# Patient Record
Sex: FEMALE | HISPANIC OR LATINO | Employment: FULL TIME | URBAN - METROPOLITAN AREA
[De-identification: names, ages, dates, MRNs, and addresses within clinical notes are randomized per-mention and may not be internally consistent; named-entity substitution may affect disease eponyms.]

---
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
## 2024-01-01 PROCEDURE — 0503F POSTPARTUM CARE VISIT: CPT | Performed by: OBSTETRICS & GYNECOLOGY

## 2024-01-01 PROCEDURE — 25010000002 ONDANSETRON PER 1 MG: Performed by: OBSTETRICS & GYNECOLOGY

## 2024-01-01 RX ORDER — IBUPROFEN 600 MG/1
600 TABLET ORAL EVERY 6 HOURS SCHEDULED
Status: DISCONTINUED | OUTPATIENT
Start: 2024-01-01 | End: 2024-01-01

## 2024-01-01 RX ORDER — OXYTOCIN/0.9 % SODIUM CHLORIDE 30/500 ML
125 PLASTIC BAG, INJECTION (ML) INTRAVENOUS CONTINUOUS PRN
Status: DISCONTINUED | OUTPATIENT
Start: 2024-01-01 | End: 2024-01-02 | Stop reason: HOSPADM

## 2024-01-01 RX ORDER — IBUPROFEN 600 MG/1
600 TABLET ORAL EVERY 6 HOURS SCHEDULED
Status: DISCONTINUED | OUTPATIENT
Start: 2024-01-01 | End: 2024-01-02 | Stop reason: HOSPADM

## 2024-01-01 RX ORDER — POLYETHYLENE GLYCOL 3350 17 G/17G
17 POWDER, FOR SOLUTION ORAL DAILY PRN
Status: DISCONTINUED | OUTPATIENT
Start: 2024-01-01 | End: 2024-01-02 | Stop reason: HOSPADM

## 2024-01-01 RX ORDER — TRAMADOL HYDROCHLORIDE 50 MG/1
50 TABLET ORAL EVERY 6 HOURS PRN
Status: DISCONTINUED | OUTPATIENT
Start: 2024-01-01 | End: 2024-01-02 | Stop reason: HOSPADM

## 2024-01-01 RX ORDER — CALCIUM CARBONATE 500 MG/1
2 TABLET, CHEWABLE ORAL 3 TIMES DAILY PRN
Status: DISCONTINUED | OUTPATIENT
Start: 2024-01-01 | End: 2024-01-02 | Stop reason: HOSPADM

## 2024-01-01 RX ORDER — ONDANSETRON 4 MG/1
4 TABLET, FILM COATED ORAL EVERY 8 HOURS PRN
Status: DISCONTINUED | OUTPATIENT
Start: 2024-01-01 | End: 2024-01-02 | Stop reason: HOSPADM

## 2024-01-01 RX ORDER — PROMETHAZINE HYDROCHLORIDE 12.5 MG/1
12.5 TABLET ORAL EVERY 4 HOURS PRN
Status: DISCONTINUED | OUTPATIENT
Start: 2024-01-01 | End: 2024-01-02 | Stop reason: HOSPADM

## 2024-01-01 RX ORDER — ACETAMINOPHEN 325 MG/1
650 TABLET ORAL EVERY 6 HOURS
Status: DISCONTINUED | OUTPATIENT
Start: 2024-01-01 | End: 2024-01-02 | Stop reason: HOSPADM

## 2024-01-01 RX ORDER — PRENATAL VIT/IRON FUM/FOLIC AC 27MG-0.8MG
1 TABLET ORAL DAILY
Status: DISCONTINUED | OUTPATIENT
Start: 2024-01-01 | End: 2024-01-02 | Stop reason: HOSPADM

## 2024-01-01 RX ORDER — DOCUSATE SODIUM 100 MG/1
100 CAPSULE, LIQUID FILLED ORAL 2 TIMES DAILY PRN
Qty: 60 CAPSULE | Refills: 1 | Status: SHIPPED | OUTPATIENT
Start: 2024-01-01

## 2024-01-01 RX ORDER — ACETAMINOPHEN 325 MG/1
650 TABLET ORAL EVERY 6 HOURS PRN
Qty: 30 TABLET | Refills: 1 | Status: SHIPPED | OUTPATIENT
Start: 2024-01-01

## 2024-01-01 RX ORDER — SODIUM CHLORIDE 0.9 % (FLUSH) 0.9 %
1-10 SYRINGE (ML) INJECTION AS NEEDED
Status: DISCONTINUED | OUTPATIENT
Start: 2024-01-01 | End: 2024-01-02 | Stop reason: HOSPADM

## 2024-01-01 RX ORDER — DOCUSATE SODIUM 100 MG/1
100 CAPSULE, LIQUID FILLED ORAL DAILY
Status: DISCONTINUED | OUTPATIENT
Start: 2024-01-01 | End: 2024-01-02 | Stop reason: HOSPADM

## 2024-01-01 RX ORDER — IBUPROFEN 600 MG/1
600 TABLET ORAL EVERY 6 HOURS PRN
Qty: 30 TABLET | Refills: 1 | Status: SHIPPED | OUTPATIENT
Start: 2024-01-01

## 2024-01-01 RX ADMIN — IBUPROFEN 600 MG: 600 TABLET, FILM COATED ORAL at 01:46

## 2024-01-01 RX ADMIN — ACETAMINOPHEN 650 MG: 325 TABLET ORAL at 04:57

## 2024-01-01 RX ADMIN — ACETAMINOPHEN 650 MG: 325 TABLET ORAL at 21:08

## 2024-01-01 RX ADMIN — Medication: at 01:46

## 2024-01-01 RX ADMIN — WITCH HAZEL: 500 SOLUTION RECTAL; TOPICAL at 01:46

## 2024-01-01 RX ADMIN — ACETAMINOPHEN 650 MG: 325 TABLET ORAL at 15:27

## 2024-01-01 RX ADMIN — DOCUSATE SODIUM 100 MG: 100 CAPSULE, LIQUID FILLED ORAL at 08:25

## 2024-01-01 RX ADMIN — IBUPROFEN 600 MG: 600 TABLET, FILM COATED ORAL at 17:27

## 2024-01-01 RX ADMIN — IBUPROFEN 600 MG: 600 TABLET, FILM COATED ORAL at 08:25

## 2024-01-01 RX ADMIN — ONDANSETRON 4 MG: 2 INJECTION INTRAMUSCULAR; INTRAVENOUS at 02:25

## 2024-01-01 RX ADMIN — VITAMIN A, VITAMIN C, VITAMIN D, VITAMIN E, THIAMINE, RIBOFLAVIN, NIACIN, VITAMIN B6, FOLIC ACID, VITAMIN B12, CALCIUM, IRON, ZINC, COPPER 1 TABLET: 4000; 120; 400; 22; 1.84; 3; 20; 10; 1; 12; 200; 27; 25; 2 TABLET ORAL at 08:25

## 2024-01-01 RX ADMIN — IBUPROFEN 600 MG: 600 TABLET, FILM COATED ORAL at 13:03

## 2024-01-01 RX ADMIN — ACETAMINOPHEN 650 MG: 325 TABLET ORAL at 11:49

## 2024-01-01 NOTE — DISCHARGE SUMMARY
OB Discharge Summary        Admit Date:  2023  Date of Delivery: 2023   Discharge Date: 24      Reason for Admission/Chief Complaint: Contractions    Final Diagnosis:  38+0, active labor  SROM    To do at FU: Routine postpartum    Antepartum:  Prenatal care is complicated by:  See above Final Diagnosis for list    Intrapartum/Delivery:  OB Surgeon:  Dr. Tammy Delcid DO  Anesthesia: Epidural, Local for repair of episiotomy  Delivery Type:   Perineum : Right mediolateral episiotomy  Feeding method: Breast    Infant: male  infant; Grey    Weight: 3745 g (8 lb 4.1 oz)      APGARS: 8  @ 1 minute / 9  @ 5 minutes    Hospital Course/Significant Findings:  Patient arrived with regular contractions 1 cm dilated, SROM in triage, progressed spontaneously to 3-4 cm dilated.  Pitocin augmentation max to 2 milliunits.  Progressed on the labor curve.  Pushed for approximately 1-1/2 hours.  Uncomplicated  and PP.  On the day of discharge POSTPARTUM pt tolerating a regular diet, ambulating, pain well controlled, urinating spontaneously and lochia appropriate.   Vital signs were stable and afebrile.  Exam was within normal limits.  Fundus was below umbilicus and nontender.  Meeting discharge criteria and desired discharge home.  Postpartum instructions and FU reviewed and questions answered.    Results from last 7 days   Lab Units 23  1129   WBC 10*3/mm3 9.87   HEMOGLOBIN g/dL 11.4*   HEMATOCRIT % 33.8*   PLATELETS 10*3/mm3 185       Discharge:         Discharge Medications        New Medications        Instructions Start Date   acetaminophen 325 MG tablet  Commonly known as: Tylenol   650 mg, Oral, Every 6 Hours PRN      docusate sodium 100 MG capsule  Commonly known as: Colace   100 mg, Oral, 2 Times Daily PRN      ibuprofen 600 MG tablet  Commonly known as: ADVIL,MOTRIN   600 mg, Oral, Every 6 Hours PRN             Continue These Medications        Instructions Start Date   calcium carbonate 500  MG chewable tablet  Commonly known as: TUMS   1 tablet, Oral, Daily      Prenatal 27-1 27-1 MG tablet tablet   1 tablet, Oral, Daily                 Disposition: Home  Diet: Regular    Activity: Pelvic rest 6 weeks    Condition at discharge: Good    Follow up with: Dr. Tammy Delcid DO or provider of her choice    Follow up in: 5 weeks      Complications: None      Signature: guzman valiente MD      [facility] LABOR AND DELIVERY  [address] [place] [address]  [facility] [address]  Dept: [phone]  Loc: [phone]

## 2024-01-01 NOTE — LACTATION NOTE
LC in to assist with this feeding. LC noted that patient is using a nipple shield. Baby is sluggish this feeding and has poor tongue extension and is irritable. LC then hand expressed to spoon and spoonfed at this feeding.

## 2024-01-01 NOTE — PAYOR COMM NOTE
"Dana Kelly (18 y.o. Female)       Date of Birth   2005    Social Security Number       Address   [address] [place] [address]    Home Phone       MRN   [id number]       Faith   None    Marital Status   Single                            Admission Date   12/31/23    Admission Type   Elective    Admitting Provider   Renaldo Cuevas MD    Attending Provider   Tammy Delcid DO    Department, Room/Bed   [facility]GUETA MOTHER BABY, M434/1       Discharge Date       Discharge Disposition       Discharge Destination                                 Attending Provider: Tammy Delcid DO    Allergies: No Known Allergies    Isolation: None   Infection: None   Code Status: CPR    Ht: 162.6 cm (64\")   Wt: 92.5 kg (204 lb)    Admission Cmt: None   Principal Problem: Normal labor [O80,Z37.9]                   Active Insurance as of 12/31/2023       Primary Coverage       Payor Plan Insurance Group Employer/Plan Group    [facility]       Payor Plan Address Payor Plan Phone Number Payor Plan Fax Number Effective Dates    [address] [phone]  10/30/2019 - None Entered    [facility] [id number]         Subscriber Name Subscriber Birth Date Member ID       JACK KELLY 8/8/1978 [id number]               Secondary Coverage       Payor Plan Insurance Group Employer/Plan Group    [place] [id number]       Payor Plan Address Payor Plan Phone Number Payor Plan Fax Number Effective Dates    [employer] [address] [address] [phone]  5/1/2023 - None Entered    [facility] [address]         Subscriber Name Subscriber Birth Date Member ID       DANA KELLY 2005 [id number]                     Emergency Contacts        (Rel.) Home Phone Work Phone Mobile Phone    JACK KELLY (Mother) [phone] -- [phone]    Nitin Kelly (Father) [phone]          MRN: [id number]  CSN: " [id number]    [facility] rTessa ,DOC   [phone]  F [phone]          Tammy Delcid DO   Physician  OB/GYN     Progress Notes     Signed     Date of Service: 12/31/23 1919  Creation Time: 12/31/23 1919     Signed         OB Intrapartum Note     Subjective: Pain NOT controlled     Objective:  Baseline:         Normal 110-160 bpm  Variability:       Moderate/Normal (amplitude 6-25 bpm)  Accelerations: Present (32 weeks+) 15 x 15 bpm  Decelerations: None  Contractions:   Regular, q2min, Pitocin at 2milliunits/min     Cervical exam:                          Dilation: AL                          Effacement: 100%                          Station: +1     Impression:    Category I  Reassuring fetus, Adequate progress, Pain not controlled     Plan:   Continue pitocin  Continue to monitor  Active labor positioning  Pelvis feels clinically adequate  Bolus epidural  Reviewed course/progress/plan with pt.  All questions answered to pts satisfaction.  Pt desires to proceed as outlined.  Specific labor and delivery desires: : delayed bathing and cord clamping           Electronically signed by Tammy Delcid DO, 12/31/23, 7:19 PM EST.                  DEL VAG 12/31 MALE-8.4.1# APGARS 8/9 WELL BABY

## 2024-01-01 NOTE — ANESTHESIA POSTPROCEDURE EVALUATION
Patient: Dana Kelly    Procedure Summary       Date: 12/31/23 Room / Location:     Anesthesia Start: 1150 Anesthesia Stop: 2240    Procedure: LABOR ANALGESIA Diagnosis:     Scheduled Providers:  Provider: Keila Sanz CRNA    Anesthesia Type: epidural ASA Status: 2            Anesthesia Type: epidural    Vitals  Vitals Value Taken Time   /64 01/01/24 0457   Temp 37.1 °C (98.8 °F) 01/01/24 0457   Pulse 82 01/01/24 0457   Resp 18 01/01/24 0457   SpO2 100 % 01/01/24 0149   Vitals shown include unfiled device data.        Post Anesthesia Care and Evaluation    Patient location during evaluation: bedside  Patient participation: complete - patient participated  Level of consciousness: awake  Pain score: 2  Pain management: adequate    Airway patency: patent  Anesthetic complications: No anesthetic complications  PONV Status: controlled  Cardiovascular status: acceptable and stable  Respiratory status: acceptable  Hydration status: acceptable  Post Neuraxial Block status: Motor and sensory function returned to baseline and No signs or symptoms of PDPH

## 2024-01-01 NOTE — PROGRESS NOTES
PostPartum/PostOp PROGRESS NOTE        Subjective:  Patient has no complaints  Pain controlled  Ambulating  Urinating spontaneously  Lochia decreasing, no bleeding concerns    Objective:     Temp:  [97.8 °F (36.6 °C)-98.9 °F (37.2 °C)] 98.8 °F (37.1 °C)  Heart Rate:  [] 82  Resp:  [16-20] 18  BP: ()/(42-97) 121/64  General- NAD, alert and oriented, appropriate  Psych- Normal mood, good memory  Cardiovascular/Respiratory- Not examined  Abdomen- Fundus firm, non tender and Fundus at U-1  Extremties/DTRs- No edema, bilaterally equal, no signs of DVT    Results from last 7 days   Lab Units 12/31/23  1129   WBC 10*3/mm3 9.87   HEMOGLOBIN g/dL 11.4*   HEMATOCRIT % 33.8*   PLATELETS 10*3/mm3 185       Assessment:    Post-partum/postop Day:  1  The patient is currently breastfeeding.    Doing well    Plan:   Routine postpartum/postop care  Remove IV  Shower  Sitz baths  Breast feeding support  DC meds reviewed  Follow up scheduled  OB Hospitalist will see pt tomorrow and probable DC tomorrow              Electronically signed by Tammy Delcid DO, 01/01/24, 7:59 AM EST.

## 2024-01-01 NOTE — DISCHARGE INSTRUCTIONS
DR. HILTON'S POSTPARTUM DISCHARGE PRECAUTIONS and Answers to FAQs     NO SEX for SIX weeks.     NO TUB BATH or POOL for TWO week(s), shower only.  Sitz baths are fine.     STITCHES (if present):  wash them daily in the shower with soap and water (any type of soap is fine, it does not need to be antibacterial soap).  It is ok to gently put your finger in and around the vaginal area.  Look at your stitches (the ones on the outside) when you get home.  You will then know what is normal and can have a point of reference to compare it to if you start to have concerns.  REDNESS, PUS, increase in PAIN, FEVER or CHILLS are all reasons to be seen our office immediately.  Go to the ER, if it is after hours or a weekend.       VAGINAL BLEEDING:  may continue on and off over the next several weeks after delivery and may increase slightly once you go home.  You should not be bleeding more than 1 large pad soaked every hour or two.  Clots (even the size of a lemon or larger) may be normal as long as the bleeding is not heavy and the clots do not continue.       FEVER or CHILLS or NOT FEELING WELL: call our office.  If the office is closed, you need to be seen in acute care or ER.       CHEST PAIN or SHORTNESS OF BREATH/AIR: you need to GO TO THE NEAREST ER or CALL 911.      SWELLING: can increase over the next 7-10 days and then should slowly improve.  Your legs/ankles should be fairly similar in size.  A red, painful, hot, swollen leg (usually just one side) can be a sign of a blood clot and should be evaluated immediately.  Call our office.  If it is after hours or a weekend, you must be seen IMMEDIATELY IN THE ER.      ELEVATED BLOOD PRESSURE:  you need to contact us if you are having  persistent elevated BP systolic (top number) more than 155 or diastolic (bottom number) more than 95, or a headache (not relieved with rest, hydration or over the counter pain reliever), an increase in your swelling (usually hands and face),  changes in your vision (typically flashing white or black spots) or severe persistent pain in the location of the upper right side of your belly (under your right breast).  Call our office or go to ER if after hours or a weekend.     LACTATION QUESTIONS or CONCERNS?  Call [facility] Lactation Support [phone].     WORK and SCHOOL TIME OFF: depends on your specific delivery type, surrounding circumstances, and your work insurance/school rules.  If you have questions, please call Ita Gonzales at [phone] (ext. [phone]).  Or email Ita at [email or web address].  They will assist in required paperwork for you and/or family members.      Any further QUESTIONS or CONCERNS, please call [facility] at [phone].

## 2024-01-01 NOTE — PROGRESS NOTES
OB Intrapartum Note    Subjective: Pain NOT controlled    Objective:  Baseline: Normal 110-160 bpm  Variability:   Moderate/Normal (amplitude 6-25 bpm)  Accelerations: Present (32 weeks+) 15 x 15 bpm  Decelerations: None  Contractions:  Regular, q2min, Pitocin at 2milliunits/min    Cervical exam:    Dilation: AL    Effacement: 100%    Station: +1    Impression:    Category I  Reassuring fetus, Adequate progress, Pain not controlled    Plan:   Continue pitocin  Continue to monitor  Active labor positioning  Pelvis feels clinically adequate  Bolus epidural  Reviewed course/progress/plan with pt.  All questions answered to pts satisfaction.  Pt desires to proceed as outlined.  Specific labor and delivery desires: : delayed bathing and cord clamping        Electronically signed by Tammy Delcid DO, 12/31/23, 7:19 PM EST.

## 2024-01-01 NOTE — L&D DELIVERY NOTE
VAGINAL DELIVERY NOTE          Date: 12/31/2023   Time:  10:40 PM   GA: 38w0d    Infant: male  infant   3745 g (8 lb 4.1 oz)     APGARS: 8  @ 1 minute / 9  @ 5 minutes    Delivery: The patient progressed to complete, complete and pushed for proximately 90 minutes to deliver a viable infant in cephalic presentation weighing the above weight and above Apgars.  At the +4 station patient was consented for episiotomy.  Lidocaine was used to infiltrate the perineum.  After episiotomy, delivery with the next push.  There was no nuchal cord.   The mouth and nares were bulb suctioned on the perineum.  The left anterior and right posterior shoulders were easily delivered without traction.  The remainder of the body delivered.  The infant was vigorous.  Delayed cord clamping for 60 seconds.  The cord was then clamped and cut.  Infant was placed on the maternal chest for recovery.  The placenta delivered intact with the assistance of fundal massage and maternal pushing efforts.      On full evaluation of the perineum, vagina, cervix and rectum a repair was placed.  Right mediolateral episiotomy without an extension, 2-0 Vicryl.  It was repaired in the usual fashion in two layers and stitches to support the perineum.  Excellent approximation and hemostasis was assured.  External anal sphincter was intact.  200mcg of Cytotec was given orally and 200mcg given SL to assist with uterine tone.    Counts were correct.      Estimated Blood Loss: 400 mls    Complications: None        Electronically signed by Tammy Delcid DO, 12/31/23, 11:17 PM EST.       [facility] LABOR AND DELIVERY  [address] [place] [address] [place] [address]  Dept: [phone]  Loc: [phone]

## 2024-01-01 NOTE — PROGRESS NOTES
Called by RN at 17:15 stating patients has hard time using right leg and unable to bare weight.   I came to evaluate pt D/B 4/4 and equal in lower extremity.  Equal sensation bilaterally. Patient states that her right groin is very sore and difficult to bend her knee.  Asked patient why she didn't tell me or OBGYN this am she didn't have an answer other than it was early.  Going to order SCD on patient while not mobile and will reevaluate in the am.  Also will have the nurse to call the OBGYN on for today

## 2024-01-02 ENCOUNTER — TELEPHONE (OUTPATIENT)
Dept: OBSTETRICS AND GYNECOLOGY | Facility: CLINIC | Age: 19
End: 2024-01-02

## 2024-01-02 VITALS
RESPIRATION RATE: 18 BRPM | HEIGHT: 64 IN | SYSTOLIC BLOOD PRESSURE: 115 MMHG | DIASTOLIC BLOOD PRESSURE: 57 MMHG | WEIGHT: 204 LBS | OXYGEN SATURATION: 99 % | TEMPERATURE: 97.7 F | BODY MASS INDEX: 34.83 KG/M2 | HEART RATE: 66 BPM

## 2024-01-02 PROCEDURE — 97161 PT EVAL LOW COMPLEX 20 MIN: CPT

## 2024-01-02 RX ADMIN — DOCUSATE SODIUM 100 MG: 100 CAPSULE, LIQUID FILLED ORAL at 09:27

## 2024-01-02 RX ADMIN — VITAMIN A, VITAMIN C, VITAMIN D, VITAMIN E, THIAMINE, RIBOFLAVIN, NIACIN, VITAMIN B6, FOLIC ACID, VITAMIN B12, CALCIUM, IRON, ZINC, COPPER 1 TABLET: 4000; 120; 400; 22; 1.84; 3; 20; 10; 1; 12; 200; 27; 25; 2 TABLET ORAL at 09:27

## 2024-01-02 RX ADMIN — ACETAMINOPHEN 650 MG: 325 TABLET ORAL at 09:27

## 2024-01-02 RX ADMIN — WITCH HAZEL: 500 SOLUTION RECTAL; TOPICAL at 03:18

## 2024-01-02 RX ADMIN — ACETAMINOPHEN 650 MG: 325 TABLET ORAL at 03:18

## 2024-01-02 RX ADMIN — IBUPROFEN 600 MG: 600 TABLET, FILM COATED ORAL at 00:22

## 2024-01-02 RX ADMIN — IBUPROFEN 600 MG: 600 TABLET, FILM COATED ORAL at 12:23

## 2024-01-02 RX ADMIN — IBUPROFEN 600 MG: 600 TABLET, FILM COATED ORAL at 05:48

## 2024-01-02 NOTE — PLAN OF CARE
Problem: Adult Inpatient Plan of Care  Goal: Plan of Care Review  Outcome: Met  Flowsheets (Taken 1/2/2024 1407)  Progress: improving  Plan of Care Reviewed With: patient  Goal: Patient-Specific Goal (Individualized)  Outcome: Met  Goal: Absence of Hospital-Acquired Illness or Injury  Outcome: Met  Intervention: Identify and Manage Fall Risk  Recent Flowsheet Documentation  Taken 1/2/2024 0927 by Dakota Trevino RN  Safety Promotion/Fall Prevention: safety round/check completed  Taken 1/2/2024 0800 by Dakota Trevino RN  Safety Promotion/Fall Prevention:   safety round/check completed   toileting scheduled  Taken 1/2/2024 0718 by Dakota Trevino RN  Safety Promotion/Fall Prevention: safety round/check completed  Intervention: Prevent and Manage VTE (Venous Thromboembolism) Risk  Recent Flowsheet Documentation  Taken 1/2/2024 0927 by Dakota Trevino RN  Activity Management:   ambulated in room   ambulated to bathroom  VTE Prevention/Management:   bilateral   sequential compression devices on  Range of Motion:   active ROM (range of motion) encouraged   ROM (range of motion) performed  Goal: Optimal Comfort and Wellbeing  Outcome: Met  Intervention: Monitor Pain and Promote Comfort  Recent Flowsheet Documentation  Taken 1/2/2024 0927 by Dakota Trevino RN  Pain Management Interventions: see MAR  Intervention: Provide Person-Centered Care  Recent Flowsheet Documentation  Taken 1/2/2024 0927 by Dakota Trevino RN  Trust Relationship/Rapport:   care explained   choices provided   questions answered   questions encouraged  Goal: Readiness for Transition of Care  Outcome: Met  Intervention: Mutually Develop Transition Plan  Recent Flowsheet Documentation  Taken 1/2/2024 1401 by Dakota Trevino RN  Equipment Needed After Discharge: none  Anticipated Changes Related to Illness: none  Transportation Concerns: none  Concerns to be Addressed: no discharge needs identified  Readmission Within the Last 30 Days: no  previous admission in last 30 days     Problem: Skin Injury Risk Increased  Goal: Skin Health and Integrity  Outcome: Met     Problem: Breastfeeding  Goal: Effective Breastfeeding  Outcome: Met  Intervention: Support Exclusive Breastfeeding Success  Recent Flowsheet Documentation  Taken 1/2/2024 0927 by Dakota Trevino RN  Supportive Measures: active listening utilized  Intervention: Promote Effective Breastfeeding  Recent Flowsheet Documentation  Taken 1/2/2024 0927 by Dakota Trevino RN  Parent/Child Attachment Promotion:   caring behavior modeled   positive reinforcement provided     Problem: Adjustment to Role Transition (Postpartum Vaginal Delivery)  Goal: Successful Maternal Role Transition  Outcome: Met  Intervention: Support Maternal Role Transition  Recent Flowsheet Documentation  Taken 1/2/2024 0927 by Dakota Trevino RN  Supportive Measures: active listening utilized  Parent/Child Attachment Promotion:   caring behavior modeled   positive reinforcement provided     Problem: Bleeding (Postpartum Vaginal Delivery)  Goal: Hemostasis  Outcome: Met     Problem: Infection (Postpartum Vaginal Delivery)  Goal: Absence of Infection Signs/Symptoms  Outcome: Met  Intervention: Prevent or Manage Infection  Recent Flowsheet Documentation  Taken 1/2/2024 0927 by Dakota Trevino RN  Perineal Care: perineal hygiene encouraged     Problem: Pain (Postpartum Vaginal Delivery)  Goal: Acceptable Pain Control  Outcome: Met  Intervention: Prevent or Manage Pain  Recent Flowsheet Documentation  Taken 1/2/2024 0927 by Dakota Trevino RN  Pain Management Interventions: see MAR     Problem: Urinary Retention (Postpartum Vaginal Delivery)  Goal: Effective Urinary Elimination  Outcome: Met  Intervention: Promote Effective Urinary Elimination  Recent Flowsheet Documentation  Taken 1/2/2024 0927 by Dakota Trevino RN  Urinary Elimination Promotion: frequent voiding encouraged   Goal Outcome Evaluation:  Plan of Care Reviewed  With: patient        Progress: improving

## 2024-01-02 NOTE — TELEPHONE ENCOUNTER
The [facility] received a fax that requires your attention. The document has been indexed to the patient’s chart for your review.      Reason for sending: FAX FOR TASNEEM HILTON DO    Documents Description: [employer] AUTH FOR DELIVERY AUTH # [id number] FROM 12/31/23 WITH TASNEEM HILTON DO.     Name of Sender: [employer]    Date Indexed: 1/1/24

## 2024-01-02 NOTE — LACTATION NOTE
Lactation nurse in to assist with feeding and go over discharge instructions. Infant had recently been circumcised and was irritable for this feeding. Infant placed in skin to skin and latched using nipple shield. Infant breast fed for about 5 minutes on the right breast using a nipple shield, but was very sluggish. Latch was attempted on left breast, but unsuccessful. Bottle of DEBM was given. Patient is planning on discharge today. LC discussed normal infant output patterns to expect and unlimited time/access to the breast but if infant is not waking by 3 hours to wake and feed using measures shown in the hospital. LC discussed checking to make sure new medications are safe to breastfeed. LC discussed alcohol use and cigarette/second hand smoke around baby and breastfeeding and discussed the impact of street drugs on infants and breastfeeding. LC used the page in the breastfeeding guide to discuss harmful effects of these. Breastfeeding/Lactation expectations and anticipatory guidance discussed for the next two weeks . LC discussed nipple care, plugged ducts, engorgement, and breast infection. LC encouraged mom to see pediatrician two days from discharge for follow up. Patient has a breastpump for home use and LC discussed good pumping guidelines and normal expectations with pumping and storage and preparation of ebm for feedings. LC discussed breastfeeding/lactation resources including the local breastfeeding support group after discharge and when to call the doctor. Patient showed good understanding.

## 2024-01-02 NOTE — THERAPY EVALUATION
Acute Care - Physical Therapy Initial Evaluation   Tressa     Patient Name: Dana Kelly  : 2005  MRN: [id number]  Today's Date: 2024      Visit Dx:     ICD-10-CM ICD-9-CM   1. Difficulty walking  R26.2 719.7     Patient Active Problem List   Diagnosis    Depressive disorder    Supervision of normal first pregnancy, antepartum    Full-term premature rupture of membranes     (normal spontaneous vaginal delivery)    Normal labor     Past Medical History:   Diagnosis Date    Allergic rhinitis     Anxiety     Asthma     uses inhalers    Depression     Exercise-induced asthma     PVC (premature ventricular contraction)     Holter Monitor - 10/2/2023   Maximum heart rate was 149.  Minimum heart rate of 54.  Average heart of 89.  There are occasional PVCs, couplets.  There was one short run of ventricular tachycardia which was nonsustained.  Echo EF 56-60%, wnl     Past Surgical History:   Procedure Laterality Date    CHOLECYSTECTOMY N/A 2022    Procedure: CHOLECYSTECTOMY LAPAROSCOPIC WITH INTRAOPERATIVE CHOLANGIOGRAM;  Surgeon: Ji Rosario MD;  Location: [facility];  Service: General;  Laterality: N/A;    TYMPANOSTOMY TUBE PLACEMENT       PT Assessment (last 12 hours)       PT Evaluation and Treatment       Row Name 24 1407          Physical Therapy Time and Intention    Subjective Information no complaints  -DP     Document Type evaluation  -DP     Mode of Treatment individual therapy;physical therapy  -DP     Patient Effort good  -DP       Row Name 24 1404          General Information    Patient Profile Reviewed yes  -DP     Patient Observations alert;cooperative;agree to therapy  -DP     General Observations of Patient Patient delivered a baby on 2023.  She has PT consult for complaint of right hip pain.  -DP     Prior Level of Function independent:;gait;transfer;bed mobility;ADL's  -DP     Equipment Currently Used at Home none  -DP     Existing  "Precautions/Restrictions no known precautions/restrictions  -DP     Barriers to Rehab none identified  -DP       Row Name 01/02/24 1407          Living Environment    Current Living Arrangements home  -DP     Home Accessibility stairs to enter home  -DP     People in Home significant other  -DP       Row Name 01/02/24 1407          Cognition    Orientation Status (Cognition) oriented x 3  -DP       Row Name 01/02/24 1407          Range of Motion Comprehensive    General Range of Motion bilateral upper extremity ROM WNL  Therapist assessed right hip flexion extension, external rotation and internal rotation range of motion.  Patient complained of no pain with range of motion or has any limitations.  -DP       Row Name 01/02/24 1407          Strength (Manual Muscle Testing)    Strength (Manual Muscle Testing) bilateral lower extremities  Patient has normal strength in bilateral lower extremity except right hip was assessed as 4+/5.  Patient did not complain of pain during strength testing  -DP       Row Name 01/02/24 1407          Bed Mobility    Bed Mobility supine-sit-supine  -DP     Supine-Sit-Supine Burton (Bed Mobility) independent  -DP       Row Name 01/02/24 1407          Transfers    Transfers sit-stand transfer  -DP       Row Name 01/02/24 1407          Sit-Stand Transfer    Sit-Stand Burton (Transfers) independent  -DP       Row Name 01/02/24 1407          Gait/Stairs (Locomotion)    Gait/Stairs Locomotion gait/ambulation independence  -DP     Burton Level (Gait) modified independence  -DP     Comment, (Gait/Stairs) Patient is able to ambulate ad maria ines. in her room, but complains of right hip \"grinding pain\"during ambulation  -DP       Row Name 01/02/24 1407          Balance    Balance Assessment standing dynamic balance  -DP     Dynamic Standing Balance supervision  -DP       Row Name 01/02/24 1407          Plan of Care Review    Outcome Evaluation Patient has a PT consult due to complaint " of right hip pain.  Patient was not noted to have any limitations with right hip range of motion or complaint of pain with range of motion.  Patient very minimal strength limitation with right hip but no complaint of pain during assessment.  Therapist educated patient on a home exercise program to include stretching exercises for bilateral lower extremities to perform before getting out of bed in the morning and if the pain does not get better then to follow-up with her primary care physician.  -DP       Row Name 01/02/24 1400          Therapy Assessment/Plan (PT)    Criteria for Skilled Interventions Met (PT) no problems identified which require skilled intervention  -DP     Therapy Frequency (PT) evaluation only  -DP       Row Name 01/02/24 1409          PT Evaluation Complexity    History, PT Evaluation Complexity no personal factors and/or comorbidities  -DP     Examination of Body Systems (PT Eval Complexity) total of 4 or more elements  -DP     Clinical Presentation (PT Evaluation Complexity) stable  -DP     Clinical Decision Making (PT Evaluation Complexity) low complexity  -DP     Overall Complexity (PT Evaluation Complexity) low complexity  -DP       Row Name 01/02/24 140          Physical Therapy Goals    Problem Specific Goal Selection (PT) problem specific goal 1, PT  -DP       Row Name 01/02/24 1401          Problem Specific Goal 1 (PT)    Problem Specific Goal 1 (PT) Complete PT evaluation.  -DP     Time Frame (Problem Specific Goal 1, PT) 1 day  -DP     Progress/Outcome (Problem Specific Goal 1, PT) goal met  -DP               User Key  (r) = Recorded By, (t) = Taken By, (c) = Cosigned By      Initials Name Provider Type    Tyson Corral, PT Physical Therapist                      PT Recommendation and Plan  Anticipated Discharge Disposition (PT): home  Therapy Frequency (PT): evaluation only  Outcome Evaluation: Patient has a PT consult due to complaint of right hip pain.  Patient was not noted  to have any limitations with right hip range of motion or complaint of pain with range of motion.  Patient very minimal strength limitation with right hip but no complaint of pain during assessment.  Therapist educated patient on a home exercise program to include stretching exercises for bilateral lower extremities to perform before getting out of bed in the morning and if the pain does not get better then to follow-up with her primary care physician.   Outcome Measures       Row Name 01/02/24 1400             How much help from another person do you currently need...    Turning from your back to your side while in flat bed without using bedrails? 4  -DP      Moving from lying on back to sitting on the side of a flat bed without bedrails? 4  -DP      Moving to and from a bed to a chair (including a wheelchair)? 4  -DP      Standing up from a chair using your arms (e.g., wheelchair, bedside chair)? 4  -DP      Climbing 3-5 steps with a railing? 4  -DP      To walk in hospital room? 4  -DP      AM-PAC 6 Clicks Score (PT) 24  -DP      Highest Level of Mobility Goal 8 --> Walked 250 feet or more  -DP         Functional Assessment    Outcome Measure Options AM-PAC 6 Clicks Basic Mobility (PT)  -DP                User Key  (r) = Recorded By, (t) = Taken By, (c) = Cosigned By      Initials Name Provider Type    Tyson Corral PT Physical Therapist                     Time Calculation:    PT Charges       Row Name 01/02/24 1420             Time Calculation    PT Received On 01/02/24  -DP         Untimed Charges    PT Eval/Re-eval Minutes 40  -DP         Total Minutes    Untimed Charges Total Minutes 40  -DP       Total Minutes 40  -DP                User Key  (r) = Recorded By, (t) = Taken By, (c) = Cosigned By      Initials Name Provider Type    Tyson Corral PT Physical Therapist                      PT G-Codes  Outcome Measure Options: AM-PAC 6 Clicks Basic Mobility (PT)  AM-PAC 6 Clicks Score (PT):  24    Tyson Goode, PT  1/2/2024

## 2024-01-02 NOTE — PLAN OF CARE
Problem: Adult Inpatient Plan of Care  Goal: Plan of Care Review  Outcome: Ongoing, Progressing  Goal: Patient-Specific Goal (Individualized)  Outcome: Ongoing, Progressing  Goal: Absence of Hospital-Acquired Illness or Injury  Outcome: Ongoing, Progressing  Intervention: Identify and Manage Fall Risk  Recent Flowsheet Documentation  Taken 1/2/2024 0318 by Trixie Delong RN  Safety Promotion/Fall Prevention: safety round/check completed  Taken 1/2/2024 0159 by Trixie Delong RN  Safety Promotion/Fall Prevention: safety round/check completed  Taken 1/2/2024 0022 by Trixie Delong RN  Safety Promotion/Fall Prevention: safety round/check completed  Taken 1/1/2024 2108 by Trixie Delong RN  Safety Promotion/Fall Prevention: safety round/check completed  Taken 1/1/2024 2040 by Trixie Delong RN  Safety Promotion/Fall Prevention: safety round/check completed  Taken 1/1/2024 1945 by Trixie Delong RN  Safety Promotion/Fall Prevention: safety round/check completed  Intervention: Prevent Skin Injury  Recent Flowsheet Documentation  Taken 1/1/2024 2108 by Trixie Delong RN  Body Position:   sitting up in bed   position changed independently  Intervention: Prevent and Manage VTE (Venous Thromboembolism) Risk  Recent Flowsheet Documentation  Taken 1/1/2024 2108 by Trixie Delong RN  VTE Prevention/Management:   bilateral   sequential compression devices on  Range of Motion: ROM (range of motion) performed  Taken 1/1/2024 2040 by Trixie Delong RN  Activity Management: ambulated to bathroom  Intervention: Prevent Infection  Recent Flowsheet Documentation  Taken 1/1/2024 2108 by Trixie Delong RN  Infection Prevention:   visitors restricted/screened   single patient room provided   rest/sleep promoted   personal protective equipment utilized   hand hygiene promoted   equipment surfaces disinfected   environmental surveillance performed   cohorting utilized  Goal: Optimal Comfort and Wellbeing  Outcome:  Ongoing, Progressing  Intervention: Monitor Pain and Promote Comfort  Recent Flowsheet Documentation  Taken 1/2/2024 0318 by Trixie Delong RN  Pain Management Interventions:   see MAR   quiet environment facilitated  Taken 1/2/2024 0022 by Trixie Delong RN  Pain Management Interventions:   see MAR   quiet environment facilitated  Taken 1/1/2024 2108 by Trixie Delong RN  Pain Management Interventions:   see MAR   pain management plan reviewed with patient/caregiver   quiet environment facilitated  Intervention: Provide Person-Centered Care  Recent Flowsheet Documentation  Taken 1/1/2024 2108 by Trixie Delong RN  Trust Relationship/Rapport:   care explained   choices provided   emotional support provided   empathic listening provided   questions answered   questions encouraged   thoughts/feelings acknowledged   reassurance provided  Goal: Readiness for Transition of Care  Outcome: Ongoing, Progressing     Problem: Adjustment to Role Transition (Postpartum Vaginal Delivery)  Goal: Successful Maternal Role Transition  Outcome: Ongoing, Progressing     Problem: Bleeding (Postpartum Vaginal Delivery)  Goal: Hemostasis  Outcome: Ongoing, Progressing     Problem: Infection (Postpartum Vaginal Delivery)  Goal: Absence of Infection Signs/Symptoms  Outcome: Ongoing, Progressing  Intervention: Prevent or Manage Infection  Recent Flowsheet Documentation  Taken 1/1/2024 2040 by Trixie Delong RN  Perineal Care:   perineum cleansed   perineal spray bottle/warm water use encouraged   perineal hygiene encouraged   absorbent brief/pad changed   medicated pads applied   topical anesthetic preparation applied     Problem: Pain (Postpartum Vaginal Delivery)  Goal: Acceptable Pain Control  Outcome: Ongoing, Progressing  Intervention: Prevent or Manage Pain  Recent Flowsheet Documentation  Taken 1/2/2024 0318 by Trixie Delong RN  Pain Management Interventions:   see MAR   quiet environment facilitated  Taken 1/2/2024 0022  by Trixie Delong, RN  Pain Management Interventions:   see MAR   quiet environment facilitated  Taken 1/1/2024 2108 by Trixie Delong, RN  Pain Management Interventions:   see MAR   pain management plan reviewed with patient/caregiver   quiet environment facilitated     Problem: Urinary Retention (Postpartum Vaginal Delivery)  Goal: Effective Urinary Elimination  Outcome: Ongoing, Progressing   Goal Outcome Evaluation:

## 2024-01-02 NOTE — PROGRESS NOTES
Patient reports some grinding sensation in hips when she walks.  She denies any weakness or numbing sensation.  I discussed with patient that it is likely related to pushing during delivery and should resolve over time. Will have PT see patient prior to discharge.

## 2024-01-02 NOTE — PLAN OF CARE
Goal Outcome Evaluation:              Outcome Evaluation: Patient has a PT consult due to complaint of right hip pain.  Patient was not noted to have any limitations with right hip range of motion or complaint of pain with range of motion.  Patient very minimal strength limitation with right hip but no complaint of pain during assessment.  Therapist educated patient on a home exercise program to include stretching exercises for bilateral lower extremities to perform before getting out of bed in the morning and if the pain does not get better then to follow-up with her primary care physician.      Anticipated Discharge Disposition (PT): home

## 2024-01-02 NOTE — NURSING NOTE
"This RN to patient room for safety rounding, 0600 motrin administration. Patient states that she \"feels like her upper right leg bones are grinding together\". RN asked patient to rate her pain on a scale of 0-10, patient reports her pain as 0. RN assess patient's range of motion of RLE. Patient able to flex and extend leg, able to bend knee, and able to externally rotate without difficulty. Patient able to internally rotate, but has difficulty doing so. RN educated patient to continue to perform ROM exercises and to continue to wear SCDs when in bed. Patient acknowledged education, had no questions at this time.  "

## 2024-01-08 ENCOUNTER — TELEPHONE (OUTPATIENT)
Dept: LACTATION | Facility: HOSPITAL | Age: 19
End: 2024-01-08
Payer: OTHER GOVERNMENT

## 2024-01-08 NOTE — TELEPHONE ENCOUNTER
ERLIN called to check on this patient and breastfeeding progress. She states she is still struggling with latch but is pumping 2-3 oz and is able to feed baby only breastmilk. LC encouraged her to make a lactation appointment is she wants to let LC work with her and baby on latching. Patient expressed good understanding and states she has the lactation phone number if needed.

## 2024-01-09 ENCOUNTER — MATERNAL SCREENING (OUTPATIENT)
Dept: CALL CENTER | Facility: HOSPITAL | Age: 19
End: 2024-01-09
Payer: OTHER GOVERNMENT

## 2024-01-09 NOTE — OUTREACH NOTE
Maternal Screening Survey      Flowsheet Row Responses   Facility patient discharged from? [facility]   Attempt successful? No   Unsuccessful attempts Attempt 2              Matthias MUELLER - Registered Nurse

## 2024-01-09 NOTE — OUTREACH NOTE
Maternal Screening Survey      Flowsheet Row Responses   Facility patient discharged from? [facility]   Attempt successful? No   Unsuccessful attempts Attempt 1              PATI VALLECILLO - Registered Nurse

## 2024-01-10 ENCOUNTER — MATERNAL SCREENING (OUTPATIENT)
Dept: CALL CENTER | Facility: HOSPITAL | Age: 19
End: 2024-01-10
Payer: OTHER GOVERNMENT

## 2024-01-10 NOTE — OUTREACH NOTE
Maternal Screening Survey      Flowsheet Row Responses   Facility patient discharged from? [facility]   Attempt successful? No   Unsuccessful attempts Attempt 3   Revoke Decline to participate              Matthias MUELLER - Registered Nurse

## 2024-02-13 PROBLEM — Z34.00 SUPERVISION OF NORMAL FIRST PREGNANCY, ANTEPARTUM: Status: RESOLVED | Noted: 2023-06-28 | Resolved: 2024-02-13

## 2024-02-13 PROBLEM — O42.92 FULL-TERM PREMATURE RUPTURE OF MEMBRANES: Status: RESOLVED | Noted: 2023-12-31 | Resolved: 2024-02-13

## 2024-02-13 PROBLEM — Z37.9 NORMAL LABOR: Status: RESOLVED | Noted: 2023-12-31 | Resolved: 2024-02-13

## 2024-02-14 ENCOUNTER — TELEPHONE (OUTPATIENT)
Dept: OBSTETRICS AND GYNECOLOGY | Facility: CLINIC | Age: 19
End: 2024-02-14

## 2024-02-19 ENCOUNTER — TELEPHONE (OUTPATIENT)
Dept: OBSTETRICS AND GYNECOLOGY | Facility: CLINIC | Age: 19
End: 2024-02-19
Payer: OTHER GOVERNMENT

## 2024-02-19 NOTE — TELEPHONE ENCOUNTER
Spoke to the patient. She states she has noted improvement since delivery. Discussed comfort measures and will call if worsening concerns.

## 2024-02-19 NOTE — TELEPHONE ENCOUNTER
Provider: DR HILTON    Caller: JAYLEN WALLIS    Relationship to Patient: SELF         Reason for Call: PT IS HAVING PAIN IN HER GROIN AREA MOSTLY WHEN SHE WALKS OR IF SHE LAYS ON HER HIP - SHE SAYS ITS LIKE HER PELVIS BONE IS GRINDING - SHE LIMPS WHEN SHE WALKS AND SHE SAID ITS EXTREMELY PAINFUL TO PUT PRESSURE ON HER LEGS PT WOULD LIKE TO BE ADVISED PLEASE CALL   UNABLE TO WT CALL

## 2024-03-05 NOTE — PROGRESS NOTES
POSTPARTUM Follow Up Visit      Chief Complaint   Patient presents with    Postpartum Care     HPI:      Date of delivery: 2023  Delivery type:            Perineum : Right mediolateral episiotomy  Delivering Provider:   Dr. Tammy Delcid        Infant feeding: Bottle  Postpartum pain: Moderate (pain 4-6), hips and pelvic bone  Vaginal bleeding : Mostly light - only using liner  Depression/Anxiety: Moderate depression; A lot of anxiety  Contracepton: Not sure    COMMENTS: I feel like my pelvic bones are rubbing together or grinding.  Like its dislocated or coming out of socket. It is getting better.  On left side feels like bone grinding.  Hip pain in preg, but this is worse.  All started after delivery.  More painful if lays on it for a while.  Dull achy, goes away w tylenol.  Has full sensation.  No muscle weakness.     Depressed/Anxious:  Yes, Hx of depression and anxiety, on no meds, took zoloft in past, wasn't on it for awhile, made her groggy.  Seeing therapist and he plans to start meds  EPDS score: 17   #10: 0  Weight at last OB OV:  204lbs     Discharge Summary by Renaldo Cuevas MD (2023 23:27)  L&D Delivery Note by Tammy Delcid DO (2023 23:17)    Infant had been admitted for not gaining wt and he is better. Then Dana admitted for the flu and symptoms have now resolved.     PHYSICAL EXAM:  /70   Wt 78.9 kg (174 lb)   LMP 2024 (Approximate)   Breastfeeding No   BMI 29.87 kg/m²  Not found.  General- NAD, alert and oriented, appropriate  Psych- Normal mood, good memory, good eye contact    Abdomen- Soft, non distended, non tender, no masses     Chaperone present during pelvic exam.  External genitalia- Normal.    Urethra/Bladder/Vagina- Normal, no masses, non-tender  Prolapse : none noted, not examined with split speculum to delineate   STITCHES : Intact, well approximated, no evidence of infection  Cervix- Normal, no lesions, no discharge, no CMT  Uterus- Normal size,  shape & consistency.  Non tender, mobile.  Adnexa- Normal, no mass, non-tender    Lymphatic- No palpable groin nodes  Extremities- No edema    ASSESSMENT AND PLAN:  Diagnoses and all orders for this visit:    1. Postpartum follow-up (Primary)    2. Birth control counseling  Comments:  Info given, considering L[facility], pamphlets provided    3. Depressive and Anxiety  Comments:  Rec keep FU therapist and consider pharmacogenetic testing and meds    4. Bilateral hip pain  Comments:  Recommend ROM, option of consult PT, pt declines        Counseling:    All birth control options reviewed in detail.  R/B/A/SE/E of each wrt pts PMHx and prior BC use  May resume intercourse  May resume normal activities  Core strengthening exercises reviewed and recommended  Kegel exercises reviewed and recommended  Ok to return to work/school        Follow Up:  Return in about 1 year (around 3/13/2025) for WWE.          Tammy Delcid DO  03/13/2024    [facility]  [facility] [place] [address]  Dept: [phone]  Dept Fax: [phone]  Loc: [phone]  Loc Fax: [phone]

## 2024-03-13 ENCOUNTER — POSTPARTUM VISIT (OUTPATIENT)
Dept: OBSTETRICS AND GYNECOLOGY | Facility: CLINIC | Age: 19
End: 2024-03-13
Payer: OTHER GOVERNMENT

## 2024-03-13 VITALS — SYSTOLIC BLOOD PRESSURE: 118 MMHG | BODY MASS INDEX: 29.87 KG/M2 | DIASTOLIC BLOOD PRESSURE: 70 MMHG | WEIGHT: 174 LBS

## 2024-03-13 DIAGNOSIS — F32.A DEPRESSIVE DISORDER: ICD-10-CM

## 2024-03-13 DIAGNOSIS — M25.551 BILATERAL HIP PAIN: ICD-10-CM

## 2024-03-13 DIAGNOSIS — M25.552 BILATERAL HIP PAIN: ICD-10-CM

## 2024-03-13 DIAGNOSIS — Z30.09 BIRTH CONTROL COUNSELING: ICD-10-CM

## 2024-03-13 PROCEDURE — 0503F POSTPARTUM CARE VISIT: CPT | Performed by: OBSTETRICS & GYNECOLOGY

## 2024-03-15 ENCOUNTER — TELEPHONE (OUTPATIENT)
Dept: OBSTETRICS AND GYNECOLOGY | Facility: CLINIC | Age: 19
End: 2024-03-15
Payer: OTHER GOVERNMENT

## 2024-03-15 DIAGNOSIS — Z30.430 ENCOUNTER FOR IUD INSERTION: Primary | ICD-10-CM

## 2024-03-15 NOTE — TELEPHONE ENCOUNTER
Provider: DO HILTON    Caller: JAYLEN BIMAL    Phone Number: [phone]    Reason for Call: PATIENT WAS IN ON 3/13/24 AND THEY DISCUSSED THE NEXPLANON AND SHE HAS DECIDED THAT SHE DOES WANT TO MOVE FORWARD AND HAVE THAT SET UP FOR INSERT//PLEASE FOLLOW UP TO GET IT SCHEDULED

## 2024-03-22 NOTE — TELEPHONE ENCOUNTER
Patient is scheduled for nexplanon insertion  04/19/24. Please sign attached order. Advised no intercourse 2 weeks prior as well.

## 2024-03-22 NOTE — ANESTHESIA PREPROCEDURE EVALUATION
Anesthesia Evaluation     Patient summary reviewed and Nursing notes reviewed                Airway   Mallampati: I  TM distance: >3 FB  Neck ROM: full  No difficulty expected  Dental      Pulmonary - normal exam    breath sounds clear to auscultation  (+) asthma,  Cardiovascular - negative cardio ROS and normal exam    Rhythm: regular  Rate: normal        Neuro/Psych  (+) psychiatric history,    GI/Hepatic/Renal/Endo - negative ROS     Musculoskeletal (-) negative ROS    Abdominal    Substance History - negative use     OB/GYN negative ob/gyn ROS         Other                        Anesthesia Plan    ASA 2     general     intravenous induction     Anesthetic plan, all risks, benefits, and alternatives have been provided, discussed and informed consent has been obtained with: patient.        CODE STATUS:        Lab Results   Component Value Date    HGBA1C 8.5 (H) 03/19/2024       Recent Labs     03/21/24  2052 03/22/24  0633 03/22/24  1034 03/22/24  1551   POCGLU 146* 120 131 102         Blood Sugar Average: Last 72 hrs:  (P) [id number]  Lantus 15 units daily while IP  Sliding scale insulin while IP  Fortunately blood sugar appears stable currently.  Metformin on hold while inpatient - Would d/c on Metformin 1000 mg bid

## 2024-04-04 ENCOUNTER — OFFICE VISIT (OUTPATIENT)
Dept: FAMILY MEDICINE CLINIC | Facility: CLINIC | Age: 19
End: 2024-04-04
Payer: OTHER GOVERNMENT

## 2024-04-04 ENCOUNTER — PATIENT ROUNDING (BHMG ONLY) (OUTPATIENT)
Dept: FAMILY MEDICINE CLINIC | Facility: CLINIC | Age: 19
End: 2024-04-04

## 2024-04-04 VITALS
BODY MASS INDEX: 29.32 KG/M2 | HEART RATE: 94 BPM | DIASTOLIC BLOOD PRESSURE: 63 MMHG | WEIGHT: 176 LBS | SYSTOLIC BLOOD PRESSURE: 105 MMHG | TEMPERATURE: 97.2 F | OXYGEN SATURATION: 98 % | HEIGHT: 65 IN

## 2024-04-04 DIAGNOSIS — Z23 ENCOUNTER FOR IMMUNIZATION: ICD-10-CM

## 2024-04-04 DIAGNOSIS — Z00.00 ANNUAL PHYSICAL EXAM: Primary | ICD-10-CM

## 2024-04-04 RX ORDER — ALBUTEROL SULFATE 90 UG/1
2 AEROSOL, METERED RESPIRATORY (INHALATION) EVERY 6 HOURS PRN
COMMUNITY

## 2024-04-04 NOTE — PROGRESS NOTES
"Chief Complaint  Annual Exam (Phusical )    Subjective      Dana Kelly is a 18 y.o. female who presents to [facility]     Patient here to establish care.    PMH: asthma, prior cholecystectomy  SH: 3-year history of vaping, no alcohol or drug use.    Recently had a baby 3 months ago. Doing well.  Recently got back to work.  She has no questions or concerns today.  She has exercise-induced asthma and uses albuterol inhaler occasionally but it is not an everyday thing.    Objective   Vital Signs:   Vitals:    04/04/24 1244   BP: 105/63   Pulse: 94   Temp: 97.2 °F (36.2 °C)   SpO2: 98%   Weight: 79.8 kg (176 lb)   Height: 163.8 cm (64.5\")     Body mass index is 29.74 kg/m².    Wt Readings from Last 3 Encounters:   04/04/24 79.8 kg (176 lb) (93%, Z= 1.51)*   03/13/24 78.9 kg (174 lb) (93%, Z= 1.47)*   12/31/23 92.5 kg (204 lb) (98%, Z= 2.00)*     * Growth percentiles are based on CDC (Girls, 2-20 Years) data.     BP Readings from Last 3 Encounters:   04/04/24 105/63   03/13/24 118/70   01/02/24 115/57       Health Maintenance   Topic Date Due    Pneumococcal Vaccine 0-64 (1 of 2 - PCV) Never done    HPV VACCINES (1 - 3-dose series) Never done    COVID-19 Vaccine (3 - 2023-24 season) 09/01/2023    CHLAMYDIA SCREENING  06/28/2024    INFLUENZA VACCINE  08/01/2024    ANNUAL PHYSICAL  04/04/2025    TDAP/TD VACCINES (2 - Td or Tdap) 08/04/2026    HEPATITIS C SCREENING  Completed    MENINGOCOCCAL VACCINE  Completed       Physical Exam  Vitals and nursing note reviewed.   Constitutional:       General: She is not in acute distress.     Appearance: Normal appearance.   Cardiovascular:      Rate and Rhythm: Normal rate and regular rhythm.   Pulmonary:      Effort: Pulmonary effort is normal. No respiratory distress.      Breath sounds: Normal breath sounds. No wheezing.   Skin:     General: Skin is warm and dry.   Neurological:      Mental Status: She is alert.   Psychiatric:         Mood " and Affect: Mood normal.         Behavior: Behavior normal.          Result Review :  The following data was reviewed by: Oswald Larkin MD on 04/04/2024:         Procedures          Assessment & Plan  Annual physical exam  Overall doing well without any concerns.  Follow-up in 1 year for annual visit or sooner if any questions or concerns  Encounter for immunization  She was interested in HPV vaccine but did not want to get it today as she had somewhere to be.  I advise she can come back anytime for nurse visit to get this HPV vaccine.             Pediatric BMI = 93 %ile (Z= 1.46) based on CDC (Girls, 2-20 Years) BMI-for-age based on BMI available as of 4/4/2024.. BMI is >= 25 and <30. (Overweight) The following options were offered after discussion;: information on healthy weight added to patient's after visit summary          FOLLOW UP  Return in 1 year (on 4/4/2025), or if symptoms worsen or fail to improve, for Annual physical.  Patient was given instructions and counseling regarding her condition or for health maintenance advice. Please see specific information pulled into the AVS if appropriate.       Oswald Larkin MD  04/04/24  13:04 EDT    CURRENT & DISCONTINUED MEDICATIONS  Current Outpatient Medications   Medication Instructions    acetaminophen (TYLENOL) 650 mg, Oral, Every 6 Hours PRN    albuterol sulfate  (90 Base) MCG/ACT inhaler 2 puffs, Inhalation, Every 6 Hours PRN       Medications Discontinued During This Encounter   Medication Reason    Prenatal Vit-Fe Fumarate-FA (Prenatal 27-1) 27-1 MG tablet tablet *Therapy completed    ibuprofen (ADVIL,MOTRIN) 600 MG tablet *Therapy completed    calcium carbonate (TUMS) 500 MG chewable tablet *Therapy completed    HPV 9-Valent Recomb Vaccine suspension 0.5 mL

## 2024-04-04 NOTE — PROGRESS NOTES
My name is Moy Light      I am  with [facility]  [facility]  [address] [place] [address] [address] [place] [address] [phone].    I am calling to officially welcome you to our practice and ask about your recent visit.    Tell me about your visit with us. What things went well?       We're always looking for ways to make our patients' experiences even better. Do you have recommendations on ways we may improve?      Overall were you satisfied with your first visit to our practice?        I appreciate you taking the time to speak with me today. Is there anything else I can do for you?     Thank you, and have a great day.

## 2024-04-17 ENCOUNTER — TELEPHONE (OUTPATIENT)
Dept: OBSTETRICS AND GYNECOLOGY | Facility: CLINIC | Age: 19
End: 2024-04-17
Payer: OTHER GOVERNMENT

## 2024-04-17 NOTE — TELEPHONE ENCOUNTER
Called patient she states was on Brooke before no issues, No problems with blood clot & not breast feeding.  I don't have access to patient Hx to update her epic hx for pertinent positives/neg for OCPs,

## 2024-04-17 NOTE — TELEPHONE ENCOUNTER
Called patient regarding her 4/19/24 canceled Nexplanon insertion.  She states she has changed her mind and wants to go on birth control. She is requesting an Rx for Brooke be sent to [facility].

## 2024-04-18 RX ORDER — ACETAMINOPHEN AND CODEINE PHOSPHATE 120; 12 MG/5ML; MG/5ML
1 SOLUTION ORAL DAILY
Qty: 90 TABLET | Refills: 4 | Status: SHIPPED | OUTPATIENT
Start: 2024-04-18

## 2024-05-22 ENCOUNTER — TELEPHONE (OUTPATIENT)
Dept: OBSTETRICS AND GYNECOLOGY | Facility: CLINIC | Age: 19
End: 2024-05-22
Payer: OTHER GOVERNMENT

## 2024-06-03 ENCOUNTER — TELEPHONE (OUTPATIENT)
Dept: OBSTETRICS AND GYNECOLOGY | Facility: CLINIC | Age: 19
End: 2024-06-03
Payer: OTHER GOVERNMENT

## 2024-12-13 ENCOUNTER — HOSPITAL ENCOUNTER (EMERGENCY)
Facility: HOSPITAL | Age: 19
Discharge: HOME | End: 2024-12-13
Attending: EMERGENCY MEDICINE
Payer: OTHER GOVERNMENT

## 2024-12-13 VITALS
BODY MASS INDEX: 27.99 KG/M2 | HEART RATE: 71 BPM | TEMPERATURE: 98.1 F | SYSTOLIC BLOOD PRESSURE: 110 MMHG | RESPIRATION RATE: 16 BRPM | WEIGHT: 167.99 LBS | DIASTOLIC BLOOD PRESSURE: 76 MMHG | OXYGEN SATURATION: 96 % | HEIGHT: 65 IN

## 2024-12-13 DIAGNOSIS — N83.201 CYST OF RIGHT OVARY: Primary | ICD-10-CM

## 2024-12-13 LAB
ALBUMIN SERPL-MCNC: 4.6 G/DL (ref 3.5–5.2)
ALBUMIN/GLOB SERPL: 2 G/DL
ALP SERPL-CCNC: 72 U/L (ref 39–117)
ALT SERPL W P-5'-P-CCNC: 39 U/L (ref 1–33)
ANION GAP SERPL CALCULATED.3IONS-SCNC: 11.5 MMOL/L (ref 5–15)
AST SERPL-CCNC: 45 U/L (ref 1–32)
BACTERIA UR QL AUTO: ABNORMAL /HPF
BASOPHILS # BLD AUTO: 0.02 10*3/MM3 (ref 0–0.2)
BASOPHILS NFR BLD AUTO: 0.2 % (ref 0–1.5)
BILIRUB SERPL-MCNC: 0.6 MG/DL (ref 0–1.2)
BILIRUB UR QL STRIP: NEGATIVE
BUN SERPL-MCNC: 8 MG/DL (ref 6–20)
BUN/CREAT SERPL: 14.8 (ref 7–25)
CALCIUM SPEC-SCNC: 9.1 MG/DL (ref 8.6–10.5)
CHLORIDE SERPL-SCNC: 107 MMOL/L (ref 98–107)
CLARITY UR: CLEAR
CO2 SERPL-SCNC: 21.5 MMOL/L (ref 22–29)
COLOR UR: ABNORMAL
CREAT SERPL-MCNC: 0.54 MG/DL (ref 0.57–1)
DEPRECATED RDW RBC AUTO: 42.3 FL (ref 37–54)
EGFRCR SERPLBLD CKD-EPI 2021: 135.4 ML/MIN/1.73
EOSINOPHIL # BLD AUTO: 0.06 10*3/MM3 (ref 0–0.4)
EOSINOPHIL NFR BLD AUTO: 0.5 % (ref 0.3–6.2)
ERYTHROCYTE [DISTWIDTH] IN BLOOD BY AUTOMATED COUNT: 13.3 % (ref 12.3–15.4)
GLOBULIN UR ELPH-MCNC: 2.3 GM/DL
GLUCOSE SERPL-MCNC: 88 MG/DL (ref 65–99)
GLUCOSE UR STRIP-MCNC: NEGATIVE MG/DL
HCG INTACT+B SERPL-ACNC: <0.5 MIU/ML
HCT VFR BLD AUTO: 37.9 % (ref 34–46.6)
HGB BLD-MCNC: 12.6 G/DL (ref 12–15.9)
HGB UR QL STRIP.AUTO: ABNORMAL
HOLD SPECIMEN: NORMAL
HOLD SPECIMEN: NORMAL
HYALINE CASTS UR QL AUTO: ABNORMAL /LPF
IMM GRANULOCYTES # BLD AUTO: 0.03 10*3/MM3 (ref 0–0.05)
IMM GRANULOCYTES NFR BLD AUTO: 0.3 % (ref 0–0.5)
KETONES UR QL STRIP: ABNORMAL
LEUKOCYTE ESTERASE UR QL STRIP.AUTO: ABNORMAL
LIPASE SERPL-CCNC: 14 U/L (ref 13–60)
LYMPHOCYTES # BLD AUTO: 1.69 10*3/MM3 (ref 0.7–3.1)
LYMPHOCYTES NFR BLD AUTO: 14.3 % (ref 19.6–45.3)
MCH RBC QN AUTO: 29 PG (ref 26.6–33)
MCHC RBC AUTO-ENTMCNC: 33.2 G/DL (ref 31.5–35.7)
MCV RBC AUTO: 87.1 FL (ref 79–97)
MONOCYTES # BLD AUTO: 0.62 10*3/MM3 (ref 0.1–0.9)
MONOCYTES NFR BLD AUTO: 5.3 % (ref 5–12)
NEUTROPHILS NFR BLD AUTO: 79.4 % (ref 42.7–76)
NEUTROPHILS NFR BLD AUTO: 9.36 10*3/MM3 (ref 1.7–7)
NITRITE UR QL STRIP: NEGATIVE
NRBC BLD AUTO-RTO: 0 /100 WBC (ref 0–0.2)
PH UR STRIP.AUTO: 6.5 [PH] (ref 5–8)
PLATELET # BLD AUTO: 250 10*3/MM3 (ref 140–450)
PMV BLD AUTO: 9.5 FL (ref 6–12)
POTASSIUM SERPL-SCNC: 3.8 MMOL/L (ref 3.5–5.2)
PROT SERPL-MCNC: 6.9 G/DL (ref 6–8.5)
PROT UR QL STRIP: ABNORMAL
RBC # BLD AUTO: 4.35 10*6/MM3 (ref 3.77–5.28)
RBC # UR STRIP: ABNORMAL /HPF
REF LAB TEST METHOD: ABNORMAL
SODIUM SERPL-SCNC: 140 MMOL/L (ref 136–145)
SP GR UR STRIP: >1.03 (ref 1–1.03)
SQUAMOUS #/AREA URNS HPF: ABNORMAL /HPF
UROBILINOGEN UR QL STRIP: ABNORMAL
WBC # UR STRIP: ABNORMAL /HPF
WBC NRBC COR # BLD AUTO: 11.78 10*3/MM3 (ref 3.4–10.8)
WHOLE BLOOD HOLD COAG: NORMAL
WHOLE BLOOD HOLD SPECIMEN: NORMAL

## 2024-12-13 PROCEDURE — 84702 CHORIONIC GONADOTROPIN TEST: CPT | Performed by: EMERGENCY MEDICINE

## 2024-12-13 PROCEDURE — 85025 COMPLETE CBC W/AUTO DIFF WBC: CPT

## 2024-12-13 PROCEDURE — 99283 EMERGENCY DEPT VISIT LOW MDM: CPT

## 2024-12-13 PROCEDURE — 80053 COMPREHEN METABOLIC PANEL: CPT | Performed by: EMERGENCY MEDICINE

## 2024-12-13 PROCEDURE — 83690 ASSAY OF LIPASE: CPT | Performed by: EMERGENCY MEDICINE

## 2024-12-13 PROCEDURE — 81001 URINALYSIS AUTO W/SCOPE: CPT | Performed by: EMERGENCY MEDICINE

## 2024-12-13 PROCEDURE — 36415 COLL VENOUS BLD VENIPUNCTURE: CPT

## 2024-12-13 RX ORDER — SODIUM CHLORIDE 0.9 % (FLUSH) 0.9 %
10 SYRINGE (ML) INJECTION AS NEEDED
Status: DISCONTINUED | OUTPATIENT
Start: 2024-12-13 | End: 2024-12-13 | Stop reason: HOSPADM

## 2024-12-13 NOTE — ED PROVIDER NOTES
Time: 6:15 PM EST  Date of encounter:  12/13/2024  Independent Historian/Clinical History and Information was obtained by:   Patient    History is limited by: N/A    Chief Complaint: Abdominal pain      History of Present Illness:  Patient is a 19 y.o. year old female who presents to the emergency department for evaluation of sudden onset of pelvic pain at approximately 1 PM today.  Patient states she awoke in severe pain localized to her right pelvic adnexal region.  She had no back pain or higher abdominal pain/flank pain.  Afebrile.  No recent dysuria or signs of infection.  She states at the time of my history and physical exam her pain is rated at 0 out of 10 and fully resolved.  She suspects that she had a ruptured ovarian cyst as she has had cyst in the past and this feels very similar.      Patient Care Team  Primary Care Provider: Oswald Larkin MD    Past Medical History:     No Known Allergies  Past Medical History:   Diagnosis Date    Allergic rhinitis     Anxiety     Asthma     uses inhalers    Depression     Exercise-induced asthma     Migraine with aura, with intractable migraine, so stated, with status migrainosus     PVC (premature ventricular contraction)     Holter Monitor - 10/2/2023   Maximum heart rate was 149.  Minimum heart rate of 54.  Average heart of 89.  There are occasional PVCs, couplets.  There was one short run of ventricular tachycardia which was nonsustained.  Echo EF 56-60%, wnl     Past Surgical History:   Procedure Laterality Date    CHOLECYSTECTOMY N/A 4/26/2022    Procedure: CHOLECYSTECTOMY LAPAROSCOPIC WITH INTRAOPERATIVE CHOLANGIOGRAM;  Surgeon: Ji Rosario MD;  Location: [facility] MAIN OR;  Service: General;  Laterality: N/A;    TYMPANOSTOMY TUBE PLACEMENT       Family History   Problem Relation Age of Onset    Diabetes Father     Pulmonary embolism Mother         w covid    Breast cancer Maternal Grandmother 50        ALSO BRAIN CANCER    Malig Hyperthermia Neg Hx      Deep vein thrombosis Neg Hx     Ovarian cancer Neg Hx     Uterine cancer Neg Hx     Colon cancer Neg Hx        Home Medications:  Prior to Admission medications    Medication Sig Start Date End Date Taking? Authorizing Provider   acetaminophen (Tylenol) 325 MG tablet Take 2 tablets by mouth Every 6 (Six) Hours As Needed for Mild Pain (alternate with motrin). 1/1/24   Tammy Delcid DO   albuterol sulfate  (90 Base) MCG/ACT inhaler Inhale 2 puffs Every 6 (Six) Hours As Needed for Wheezing.    Provider, MD Augusto   norethindrone (MICRONOR) 0.35 MG tablet Take 1 tablet by mouth Daily. 4/18/24   Tammy Delcid DO        Social History:   Social History     Tobacco Use    Smoking status: Former    Smokeless tobacco: Never    Tobacco comments:     pt states she starting vaping approx.3 yrs ago.   Vaping Use    Vaping status: Every Day    Substances: Nicotine, Flavoring    Devices: Disposable   Substance Use Topics    Alcohol use: Never    Drug use: Never         Review of Systems:  Review of Systems   Constitutional:  Negative for chills and fever.   HENT:  Negative for congestion, rhinorrhea and sore throat.    Eyes:  Negative for photophobia.   Respiratory:  Negative for apnea, cough, chest tightness and shortness of breath.    Cardiovascular:  Negative for chest pain and palpitations.   Gastrointestinal:  Negative for abdominal pain, diarrhea, nausea and vomiting.   Endocrine: Negative.    Genitourinary:  Positive for pelvic pain. Negative for difficulty urinating and dysuria.   Musculoskeletal:  Negative for back pain, joint swelling and myalgias.   Skin:  Negative for color change and wound.   Allergic/Immunologic: Negative.    Neurological:  Negative for seizures and headaches.   Psychiatric/Behavioral: Negative.     All other systems reviewed and are negative.       Physical Exam:  /76 (BP Location: Left arm, Patient Position: Sitting)   Pulse 71   Temp 98.1 °F (36.7 °C) (Oral)   Resp 16   Ht  "163.8 cm (64.5\")   Wt 76.2 kg (167 lb 15.9 oz)   LMP 12/13/2024 (Exact Date)   SpO2 96%   BMI 28.39 kg/m²     Physical Exam  Vitals and nursing note reviewed.   Constitutional:       General: She is awake.      Appearance: Normal appearance. She is well-developed.   HENT:      Head: Normocephalic and atraumatic.      Nose: Nose normal.      Mouth/Throat:      Mouth: Mucous membranes are moist.   Eyes:      Extraocular Movements: Extraocular movements intact.      Pupils: Pupils are equal, round, and reactive to light.   Cardiovascular:      Rate and Rhythm: Normal rate and regular rhythm.      Heart sounds: Normal heart sounds.   Pulmonary:      Effort: Pulmonary effort is normal. No respiratory distress.      Breath sounds: Normal breath sounds. No wheezing, rhonchi or rales.   Abdominal:      General: Bowel sounds are normal.      Palpations: Abdomen is soft.      Tenderness: There is no abdominal tenderness. There is no guarding or rebound.      Comments: No rigidity   Musculoskeletal:         General: No tenderness. Normal range of motion.      Cervical back: Normal range of motion and neck supple.   Skin:     General: Skin is warm and dry.      Coloration: Skin is not jaundiced.   Neurological:      General: No focal deficit present.      Mental Status: She is alert. Mental status is at baseline.   Psychiatric:         Mood and Affect: Mood normal.                    Medical Decision Making:      Comorbidities that affect care:    Anxiety/depression, asthma, PVCs, migraines, ovarian cysts    External Notes reviewed:    Previous Clinic Note: Family medicine office visit 4/4/2024.  Description: Annual physical exam      The following orders were placed and all results were independently analyzed by me:  Orders Placed This Encounter   Procedures    Leblanc Draw    Comprehensive Metabolic Panel    Lipase    Urinalysis With Microscopic If Indicated (No Culture) - Urine, Clean Catch    hCG, Quantitative, " Pregnancy    CBC Auto Differential    Urinalysis, Microscopic Only - Urine, Clean Catch    Undress & Gown    CBC & Differential    Green Top (Gel)    Lavender Top    Gold Top - SST    Light Blue Top       Medications Given in the Emergency Department:  Medications - No data to display       ED Course:         Labs:    Lab Results (last 24 hours)       Procedure Component Value Units Date/Time    CBC & Differential [[id number]]  (Abnormal) Collected: 12/13/24 1714    Specimen: Blood from Arm, Right Updated: 12/13/24 1723    Narrative:      The following orders were created for panel order CBC & Differential.  Procedure                               Abnormality         Status                     ---------                               -----------         ------                     CBC Auto Differential[[id number]]        Abnormal            Final result                 Please view results for these tests on the individual orders.    Comprehensive Metabolic Panel [[id number]]  (Abnormal) Collected: 12/13/24 1714    Specimen: Blood from Arm, Right Updated: 12/13/24 1743     Glucose 88 mg/dL      BUN 8 mg/dL      Creatinine 0.54 mg/dL      Sodium 140 mmol/L      Potassium 3.8 mmol/L      Chloride 107 mmol/L      CO2 21.5 mmol/L      Calcium 9.1 mg/dL      Total Protein 6.9 g/dL      Albumin 4.6 g/dL      ALT (SGPT) 39 U/L      AST (SGOT) 45 U/L      Alkaline Phosphatase 72 U/L      Total Bilirubin 0.6 mg/dL      Globulin 2.3 gm/dL      A/G Ratio 2.0 g/dL      BUN/Creatinine Ratio 14.8     Anion Gap 11.5 mmol/L      eGFR 135.4 mL/min/1.73     Narrative:      GFR Categories in Chronic Kidney Disease (CKD)      GFR Category          GFR (mL/min/1.73)    Interpretation  G1                     90 or greater         Normal or high (1)  G2                      60-89                Mild decrease (1)  G3a                   45-59                Mild to moderate decrease  G3b                   30-44                Moderate to severe  decrease  G4                    15-29                Severe decrease  G5                    14 or less           Kidney failure          (1)In the absence of evidence of kidney disease, neither GFR category G1 or G2 fulfill the criteria for CKD.    eGFR calculation 2021 CKD-EPI creatinine equation, which does not include race as a factor    Lipase [[id number]]  (Normal) Collected: 12/13/24 1714    Specimen: Blood from Arm, Right Updated: 12/13/24 1743     Lipase 14 U/L     hCG, Quantitative, Pregnancy [[id number]] Collected: 12/13/24 1714    Specimen: Blood from Arm, Right Updated: 12/13/24 1741     HCG Quantitative <0.50 mIU/mL     Narrative:      HCG Ranges by Gestational Age    Females - non-pregnant premenopausal   </= 1mIU/mL HCG  Females - postmenopausal               </= 7mIU/mL HCG    3 Weeks       5.4   -      72 mIU/mL  4 Weeks      10.2   -     708 mIU/mL  5 Weeks       217   -   8,245 mIU/mL  6 Weeks       152   -  32,177 mIU/mL  7 Weeks     4,059   - 153,767 mIU/mL  8 Weeks    31,366   - 149,094 mIU/mL  9 Weeks    59,109   - 135,901 mIU/mL  10 Weeks   44,186   - 170,409 mIU/mL  12 Weeks   27,107   - 201,615 mIU/mL  14 Weeks   24,302   -  93,646 mIU/mL  15 Weeks   12,540   -  69,747 mIU/mL  16 Weeks    8,904   -  55,332 mIU/mL  17 Weeks    8,240   -  51,793 mIU/mL  18 Weeks    9,649   -  55,271 mIU/mL      CBC Auto Differential [[id number]]  (Abnormal) Collected: 12/13/24 1714    Specimen: Blood from Arm, Right Updated: 12/13/24 1723     WBC 11.78 10*3/mm3      RBC 4.35 10*6/mm3      Hemoglobin 12.6 g/dL      Hematocrit 37.9 %      MCV 87.1 fL      MCH 29.0 pg      MCHC 33.2 g/dL      RDW 13.3 %      RDW-SD 42.3 fl      MPV 9.5 fL      Platelets 250 10*3/mm3      Neutrophil % 79.4 %      Lymphocyte % 14.3 %      Monocyte % 5.3 %      Eosinophil % 0.5 %      Basophil % 0.2 %      Immature Grans % 0.3 %      Neutrophils, Absolute 9.36 10*3/mm3      Lymphocytes, Absolute 1.69 10*3/mm3      Monocytes, Absolute  0.62 10*3/mm3      Eosinophils, Absolute 0.06 10*3/mm3      Basophils, Absolute 0.02 10*3/mm3      Immature Grans, Absolute 0.03 10*3/mm3      nRBC 0.0 /100 WBC     Urinalysis With Microscopic If Indicated (No Culture) - Urine, Clean Catch [[id number]]  (Abnormal) Collected: 12/13/24 1722    Specimen: Urine, Clean Catch Updated: 12/13/24 1807     Color, UA Dark Yellow     Appearance, UA Clear     pH, UA 6.5     Specific Gravity, UA >1.030     Glucose, UA Negative     Ketones, UA 80 mg/dL (3+)     Bilirubin, UA Negative     Blood, UA Large (3+)     Protein, UA 30 mg/dL (1+)     Leuk Esterase, UA Trace     Nitrite, UA Negative     Urobilinogen, UA 1.0 E.U./dL    Urinalysis, Microscopic Only - Urine, Clean Catch [[id number]]  (Abnormal) Collected: 12/13/24 1722    Specimen: Urine, Clean Catch Updated: 12/13/24 1811     RBC, UA Too Numerous to Count /HPF      WBC, UA 3-5 /HPF      Bacteria, UA 1+ /HPF      Squamous Epithelial Cells, UA 3-6 /HPF      Hyaline Casts, UA 7-12 /LPF      Methodology Automated Microscopy             Imaging:    No Radiology Exams Resulted Within Past 24 Hours      Differential Diagnosis and Discussion:    Pelvic Pain: Differential diagnosis includes but is not limited to ectopic pregnancy, ovarian torsion, dysmenorrhea, tubo-ovarian abscess, ovarian cyst, ovulation, oophoritis, abdominal pregnancy, appendicitis, diverticulitis, cystitis, and renal colic    PROCEDURES:    Labs were drawn in the emergency department and all labs were reviewed and interpreted by me.    No orders to display       Procedures    MDM                     Patient Care Considerations:    I considered lab workup and pelvic ultrasound, however the patient's symptoms have fully resolved.      Consultants/Shared Management Plan:    None    Social Determinants of Health:    Patient is independent, reliable, and has access to care.       Disposition and Care Coordination:    Discharged: The patient is suitable and stable for  discharge with no need for consideration of admission.    I have explained the patient´s condition, diagnoses and treatment plan based on the information available to me at this time. I have answered questions and addressed any concerns. The patient has a good  understanding of the patient´s diagnosis, condition, and treatment plan as can be expected at this point. The vital signs have been stable. The patient´s condition is stable and appropriate for discharge from the emergency department.      The patient will pursue further outpatient evaluation with the primary care physician or other designated or consulting physician as outlined in the discharge instructions. They are agreeable to this plan of care and follow-up instructions have been explained in detail. The patient has received these instructions in written format and has expressed an understanding of the discharge instructions. The patient is aware that any significant change in condition or worsening of symptoms should prompt an immediate return to this or the closest emergency department or call to 911.    Final diagnoses:   Cyst of right ovary        ED Disposition       ED Disposition   Discharge    Condition   Stable    Comment   --               This medical record created using voice recognition software.             Jeremy Byrd MD  12/13/24 0691

## 2024-12-13 NOTE — DISCHARGE INSTRUCTIONS
Return to the emergency department immediately for fever, return of persistent or severe pain.  As we discussed, with your pain being fully resolved there is no real indication to proceed with either CT or ultrasound.  Stay well-hydrated.

## 2025-05-25 ENCOUNTER — HOSPITAL ENCOUNTER (EMERGENCY)
Facility: HOSPITAL | Age: 20
Discharge: HOME | End: 2025-05-25
Attending: EMERGENCY MEDICINE | Admitting: EMERGENCY MEDICINE

## 2025-05-25 VITALS
RESPIRATION RATE: 18 BRPM | TEMPERATURE: 99.8 F | WEIGHT: 154.76 LBS | OXYGEN SATURATION: 100 % | BODY MASS INDEX: 26.42 KG/M2 | DIASTOLIC BLOOD PRESSURE: 68 MMHG | SYSTOLIC BLOOD PRESSURE: 111 MMHG | HEIGHT: 64 IN | HEART RATE: 112 BPM

## 2025-05-25 DIAGNOSIS — R50.9 FEVER, UNSPECIFIED FEVER CAUSE: ICD-10-CM

## 2025-05-25 DIAGNOSIS — J02.0 STREP PHARYNGITIS: Primary | ICD-10-CM

## 2025-05-25 LAB
FLUAV RNA RESP QL NAA+PROBE: NOT DETECTED
FLUBV RNA RESP QL NAA+PROBE: NOT DETECTED
RSV RNA RESP QL NAA+PROBE: NOT DETECTED
S PYO AG THROAT QL: POSITIVE
SARS-COV-2 RNA RESP QL NAA+PROBE: NOT DETECTED

## 2025-05-25 PROCEDURE — 87637 SARSCOV2&INF A&B&RSV AMP PRB: CPT | Performed by: EMERGENCY MEDICINE

## 2025-05-25 PROCEDURE — 63710000001 PREDNISONE PER 1 MG: Performed by: NURSE PRACTITIONER

## 2025-05-25 PROCEDURE — 87880 STREP A ASSAY W/OPTIC: CPT | Performed by: EMERGENCY MEDICINE

## 2025-05-25 PROCEDURE — 99283 EMERGENCY DEPT VISIT LOW MDM: CPT

## 2025-05-25 RX ORDER — PREDNISONE 20 MG/1
60 TABLET ORAL ONCE
Status: COMPLETED | OUTPATIENT
Start: 2025-05-25 | End: 2025-05-25

## 2025-05-25 RX ORDER — AMOXICILLIN 875 MG/1
875 TABLET, COATED ORAL 2 TIMES DAILY
Qty: 14 TABLET | Refills: 0 | Status: SHIPPED | OUTPATIENT
Start: 2025-05-25 | End: 2025-06-01

## 2025-05-25 RX ORDER — PREDNISONE 20 MG/1
40 TABLET ORAL DAILY
Qty: 8 TABLET | Refills: 0 | Status: SHIPPED | OUTPATIENT
Start: 2025-05-25 | End: 2025-05-29

## 2025-05-25 RX ADMIN — PREDNISONE 60 MG: 20 TABLET ORAL at 22:15

## 2025-05-25 RX ADMIN — AMOXICILLIN AND CLAVULANATE POTASSIUM 1 TABLET: 875; 125 TABLET, FILM COATED ORAL at 22:15

## 2025-05-25 NOTE — Clinical Note
[facility] EMERGENCY ROOM  [address] [place] [address]  Phone: [phone]  Fax: [phone]    Dana Kelly was seen and treated in our emergency department on 5/25/2025.  She may return to work on 05/29/2025.         Thank you for choosing [facility].    Latonya López APRN

## 2025-05-25 NOTE — Clinical Note
[facility] EMERGENCY ROOM  [address] [place] [address]  Phone: [phone]  Fax: [phone]    Dana Kelly was seen and treated in our emergency department on 5/25/2025.  She may return to work on 05/29/2025.         Thank you for choosing [facility].    Latonya López APRN

## 2025-05-26 NOTE — ED PROVIDER NOTES
Time: 8:49 PM EDT  Date of encounter:  5/25/2025  Independent Historian/Clinical History and Information was obtained by:   Patient    History is limited by: N/A    Chief Complaint: SORE THROAT/BODY ACHES/FEVER      History of Present Illness:    The patient is a 20 y.o. year old female who presents to the emergency department for evaluation of fever and bodyaches and sore throat that she states she woke up with this morning.  She reports that she went to bed last night feeling fine but woke up with a fever and states that she is felt hot most of the day.  She states she is also having considerable amount of pain when she is trying to swallow but is handling her secretions without difficulties.  She denies any cough or congestion.  She denies any nausea vomiting.  She states she has had no diarrhea.  She reports that she is healthy otherwise.  On exam her breath sounds are clear.  Her airway is patent.  She does have red erythematous tonsils with no exudate noted on exam.      Patient Care Team  Primary Care Provider: Oswald Larkin MD    Past Medical History:     No Known Allergies  Past Medical History:   Diagnosis Date    Allergic rhinitis 9/30/2020    Anxiety     Asthma     uses inhalers    Depression     Exercise-induced asthma 9/30/2020    Migraine with aura, with intractable migraine, so stated, with status migrainosus     PVC (premature ventricular contraction) 10/4/2023    Holter Monitor - 10/2/2023   Maximum heart rate was 149.  Minimum heart rate of 54.  Average heart of 89.  There are occasional PVCs, couplets.  There was one short run of ventricular tachycardia which was nonsustained.  Echo EF 56-60%, wnl     Past Surgical History:   Procedure Laterality Date    CHOLECYSTECTOMY N/A 4/26/2022    Procedure: CHOLECYSTECTOMY LAPAROSCOPIC WITH INTRAOPERATIVE CHOLANGIOGRAM;  Surgeon: Ji Rosario MD;  Location: [facility] MAIN OR;  Service: General;  Laterality: N/A;    TYMPANOSTOMY TUBE PLACEMENT        Family History   Problem Relation Age of Onset    Diabetes Father     Pulmonary embolism Mother         w covid    Breast cancer Maternal Grandmother 50        ALSO BRAIN CANCER    Malig Hyperthermia Neg Hx     Deep vein thrombosis Neg Hx     Ovarian cancer Neg Hx     Uterine cancer Neg Hx     Colon cancer Neg Hx        Home Medications:  Prior to Admission medications    Medication Sig Start Date End Date Taking? Authorizing Provider   acetaminophen (Tylenol) 325 MG tablet Take 2 tablets by mouth Every 6 (Six) Hours As Needed for Mild Pain (alternate with motrin). 1/1/24   Tammy Delcid DO   albuterol sulfate  (90 Base) MCG/ACT inhaler Inhale 2 puffs Every 6 (Six) Hours As Needed for Wheezing.    Provider, MD Augusto   norethindrone (MICRONOR) 0.35 MG tablet Take 1 tablet by mouth Daily. 4/18/24   Tammy Delcid DO        Social History:   Social History     Tobacco Use    Smoking status: Former    Smokeless tobacco: Never    Tobacco comments:     pt states she starting vaping approx.3 yrs ago.   Vaping Use    Vaping status: Every Day    Substances: Nicotine, Flavoring    Devices: Disposable   Substance Use Topics    Alcohol use: Never    Drug use: Never         Review of Systems:  Review of Systems   Constitutional:  Positive for activity change, appetite change, fatigue and fever. Negative for chills.   HENT:  Positive for sore throat. Negative for congestion, ear pain, rhinorrhea, trouble swallowing and voice change.    Eyes:  Negative for pain.   Respiratory:  Negative for cough, chest tightness, shortness of breath, wheezing and stridor.    Cardiovascular:  Negative for chest pain.   Gastrointestinal:  Negative for abdominal pain, diarrhea, nausea and vomiting.   Genitourinary:  Negative for flank pain and hematuria.   Musculoskeletal:  Negative for back pain and joint swelling.   Skin:  Negative for pallor.   Neurological:  Negative for seizures and headaches.   All other systems reviewed and are  "negative.       Physical Exam:  /66 (BP Location: Right arm, Patient Position: Sitting)   Pulse 115   Temp 99.9 °F (37.7 °C) (Oral)   Resp 18   Ht 162.6 cm (64\")   Wt 70.2 kg (154 lb 12.2 oz)   LMP 05/18/2025 (Approximate)   SpO2 100%   BMI 26.57 kg/m²     Physical Exam  Vitals and nursing note reviewed.   Constitutional:       General: She is not in acute distress.     Appearance: Normal appearance. She is not ill-appearing or toxic-appearing.   HENT:      Head: Normocephalic and atraumatic.      Nose: Nose normal.      Mouth/Throat:      Mouth: Mucous membranes are moist.      Pharynx: Posterior oropharyngeal erythema present. No oropharyngeal exudate.   Eyes:      General: No scleral icterus.     Conjunctiva/sclera: Conjunctivae normal.      Pupils: Pupils are equal, round, and reactive to light.   Cardiovascular:      Rate and Rhythm: Normal rate and regular rhythm.      Pulses: Normal pulses.   Pulmonary:      Effort: Pulmonary effort is normal. No respiratory distress.      Breath sounds: Normal breath sounds. No wheezing.   Abdominal:      General: Abdomen is flat.   Musculoskeletal:         General: Normal range of motion.      Cervical back: Normal range of motion and neck supple. No rigidity or tenderness.   Lymphadenopathy:      Cervical: No cervical adenopathy.   Skin:     General: Skin is warm and dry.      Capillary Refill: Capillary refill takes less than 2 seconds.      Findings: No rash.   Neurological:      General: No focal deficit present.      Mental Status: She is alert and oriented to person, place, and time. Mental status is at baseline.   Psychiatric:         Mood and Affect: Mood normal.         Behavior: Behavior normal.        Medical Decision Making:      Comorbidities that affect care:    Asthma, depression, allergic rhinitis, migraines, anxiety, PVCs    External Notes reviewed:    Previous ED Note: Patient was seen in the emergency department on 12/13/2024 for right " ovarian cyst.      The following orders were placed and all results were independently analyzed by me:  Orders Placed This Encounter   Procedures    COVID-19, FLU A/B, RSV PCR 1 HR TAT - Swab, Nasopharynx    Rapid Strep A Screen - Swab, Throat       Medications Given in the Emergency Department:  Medications   amoxicillin-clavulanate (AUGMENTIN) 875-125 MG per tablet 1 tablet (has no administration in time range)   predniSONE (DELTASONE) tablet 60 mg (has no administration in time range)        ED Course:         Labs:    Lab Results (last 24 hours)       Procedure Component Value Units Date/Time    COVID-19, FLU A/B, RSV PCR 1 HR TAT - Swab, Nasopharynx [[id number]]  (Normal) Collected: 05/25/25 2029    Specimen: Swab from Nasopharynx Updated: 05/25/25 2116     COVID19 Not Detected     Influenza A PCR Not Detected     Influenza B PCR Not Detected     RSV, PCR Not Detected    Narrative:      Fact sheet for providers: https://www.fda.gov/media/446119/download    Fact sheet for patients: https://www.fda.gov/media/603293/download    Test performed by PCR.    Rapid Strep A Screen - Swab, Throat [[id number]]  (Abnormal) Collected: 05/25/25 2029    Specimen: Swab from Throat Updated: 05/25/25 2041     Strep A Ag Positive             Imaging:    No Radiology Exams Resulted Within Past 24 Hours      Differential Diagnosis and Discussion:    Fever: Based on the complaint of fever, differential diagnosis includes but is not limited to meningitis, pneumonia, pyelonephritis, acute uti,  systemic immune response syndrome, sepsis, viral syndrome, fungal infection, tick born illness and other bacterial infections.  Sore Throat: Differential diagnosis includes but is not limited to bacterial infection, viral infection, inhaled irritants, sinus drainage, thyroiditis, epiglottitis, and retropharyngeal abscess.    PROCEDURES:    Labs were collected in the emergency department and all labs were reviewed and interpreted by me.    No  orders to display       Procedures    MDM  Number of Diagnoses or Management Options  Fever, unspecified fever cause: new and requires workup  Strep pharyngitis: new and requires workup     Amount and/or Complexity of Data Reviewed  Clinical lab tests: reviewed    Risk of Complications, Morbidity, and/or Mortality  Presenting problems: low  Diagnostic procedures: low  Management options: low    Patient Progress  Patient progress: stable       Patient Care Considerations:    LABS: I considered ordering labs, however since the patient stable condition and complaint I did not feel any further labs were necessary at this time.      Consultants/Shared Management Plan:    None    Social Determinants of Health:    Patient is independent, reliable, and has access to care.       Disposition and Care Coordination:    Discharged: The patient is suitable and stable for discharge with no need for consideration of admission.    I have explained the patient´s condition, diagnoses and treatment plan based on the information available to me at this time. I have answered questions and addressed any concerns. The patient has a good  understanding of the patient´s diagnosis, condition, and treatment plan as can be expected at this point. The vital signs have been stable. The patient´s condition is stable and appropriate for discharge from the emergency department.      The patient will pursue further outpatient evaluation with the primary care physician or other designated or consulting physician as outlined in the discharge instructions. They are agreeable to this plan of care and follow-up instructions have been explained in detail. The patient has received these instructions in written format and has expressed an understanding of the discharge instructions. The patient is aware that any significant change in condition or worsening of symptoms should prompt an immediate return to this or the closest emergency department or call to  911.  I have explained discharge medications and the need for follow up with the patient/caretakers. This was also printed in the discharge instructions. Patient was discharged with the following medications and follow up:      Medication List        New Prescriptions      amoxicillin 875 MG tablet  Commonly known as: AMOXIL  Take 1 tablet by mouth 2 (Two) Times a Day for 7 days.     predniSONE 20 MG tablet  Commonly known as: DELTASONE  Take 2 tablets by mouth Daily for 4 days.               Where to Get Your Medications        These medications were sent to [id number] - [place], [place] - [address] AT [facility] OF [address] /[facility] & [place] - [phone]  - [phone]   [address] [place] [address]      Phone: [phone]   amoxicillin 875 MG tablet  predniSONE 20 MG tablet      Oswald Larkin MD  [address] [place] [address] [phone]    Call   FOR FOLLOW UP       Final diagnoses:   Strep pharyngitis   Fever, unspecified fever cause        ED Disposition       ED Disposition   Discharge    Condition   Stable    Comment   --               This medical record created using voice recognition software.             Latonya López, APRN  05/25/25 1353

## 2025-05-26 NOTE — ED TRIAGE NOTES
Pt comes into the ED tonight for body aches and a sore throat. Pt states it started this morning.

## 2025-05-26 NOTE — DISCHARGE INSTRUCTIONS
Your COVID, flu, and RSV today were all negative however, your strep was positive.  You will need to take your meds as prescribed.  Complete the antibiotics.  Drink plenty of fluids over the next several days and take over-the-counter acetaminophen and Motrin as needed for aches pains and fever.  You may use over-the-counter Chloraseptic spray or lozenges to help with comfort and warm salt water gargle several times a day may help as well.  Replace your toothbrush 24 hours after the start of your antibiotics to help prevent reinfection.  Follow-up with your family doctor next week to ensure that your symptoms are improving with rest, time, and medications.  Return to the emergency department immediately for any acutely developing respiratory distress, any airway difficulties, any persistent vomiting or any new or worse concerns.

## (undated) DEVICE — SOL NACL 0.9PCT 1000ML

## (undated) DEVICE — GLV SURG BIOGEL LTX PF 7 1/2

## (undated) DEVICE — GOWN,REINFORCE,POLY,SIRUS,BREATH SLV,XLG: Brand: MEDLINE

## (undated) DEVICE — LAP PORT CLOSURE GUIDES 5MM AND 10/12MM: Brand: LAP PORT CLOSURE GUIDES 5MM AND 10/12MM

## (undated) DEVICE — SLV SCD KN/LEN ADJ EXPRSS BLENDED MD 1P/U

## (undated) DEVICE — LAPAROSCOPIC DISSECTOR: Brand: DEROYAL

## (undated) DEVICE — LAPAROSCOPIC ELECTRODE WITH A 3/32" PIN CONNECTOR, L-HOOK 5 MM X 27 CM: Brand: CONMED

## (undated) DEVICE — PENCL E/S HNDSWCH ROCKR CB

## (undated) DEVICE — INTENDED FOR TISSUE SEPARATION, AND OTHER PROCEDURES THAT REQUIRE A SHARP SURGICAL BLADE TO PUNCTURE OR CUT.: Brand: BARD-PARKER ® CARBON RIB-BACK BLADES

## (undated) DEVICE — GENERAL LAPAROSCOPY-LF: Brand: MEDLINE INDUSTRIES, INC.

## (undated) DEVICE — 3M™ STERI-DRAPE™ X-RAY IMAGE INTENSIFIER DRAPE, 10 PER CARTON / 4 CARTONS PER CASE, 1013: Brand: STERI-DRAPE™

## (undated) DEVICE — 2, DISPOSABLE SUCTION/IRRIGATOR WITHOUT DISPOSABLE TIP: Brand: STRYKEFLOW

## (undated) DEVICE — LAPAROSCOPIC SMOKE EVACUATION SYSTEM ACTIVE AND PASSIVE: Brand: VALLEYLAB

## (undated) DEVICE — SUT VIC PLS CTD BR 0 TIE 18IN VIL

## (undated) DEVICE — 3M™ STERI-STRIP™ REINFORCED ADHESIVE SKIN CLOSURES, R1547, 1/2 IN X 4 IN (12 MM X 100 MM), 6 STRIPS/ENVELOPE: Brand: 3M™ STERI-STRIP™

## (undated) DEVICE — GLV SURG SENSICARE PI LF PF 8 GRN STRL

## (undated) DEVICE — NON-WOVEN ADHESIVE WOUND DRESSING: Brand: PRIMAPORE ADHESIVE WOUND DRSG 7.2*5CM

## (undated) DEVICE — DECANTER: Brand: UNBRANDED

## (undated) DEVICE — ENDOPATH XCEL WITH OPTIVIEW TECHNOLOGY UNIVERSAL TROCAR STABILITY SLEEVES: Brand: ENDOPATH XCEL OPTIVIEW

## (undated) DEVICE — 30977 SEE SHARP - ENHANCED INTRAOPERATIVE LAPAROSCOPE CLEANING & DEFOGGING: Brand: 30977 SEE SHARP - ENHANCED INTRAOPERATIVE LAPAROSCOPE CLEANING & DEFOGGING

## (undated) DEVICE — TISSUE RETRIEVAL SYSTEM: Brand: INZII RETRIEVAL SYSTEM

## (undated) DEVICE — ADHS LIQ MASTISOL 2/3ML

## (undated) DEVICE — ENDOPATH XCEL WITH OPTIVIEW TECHNOLOGY BLADELESS TROCARS WITH STABILITY SLEEVES: Brand: ENDOPATH XCEL OPTIVIEW

## (undated) DEVICE — SUT MNCRYL 4/0 PS2 18 IN

## (undated) DEVICE — ENDOPATH PNEUMONEEDLE INSUFFLATION NEEDLES WITH LUER LOCK CONNECTORS 120MM: Brand: ENDOPATH

## (undated) DEVICE — THE KUMAR CATHETER®, USED IN CONJUNCTION WITH KUMAR CHOLANGIOGRAPHY® CLAMP, IS MEANT TO PROVIDE A MEANS OF LAPAROSCOPIC CHOLANGIOGRAPHY. IT COMPRISES A TRANSLUCENT TUBING ( 76 CM. LENGTH AND 16 GA. ) THAT CARRIES A 19 GA., 1.25 CM LONG NEEDLE AT THE END. THE KUMAR CATHETER® IS USED TO PUNCTURE THE HARTMANN'S POUCH OF THE GALLBLADDER FOR BILIARY ACCESS AND / OR ASPIRATION. PRODUCT IS LATEX FREE.: Brand: KUMAR CATHETER®

## (undated) DEVICE — 3 RING SUTURE PASSER - 16 CM: Brand: 3 RING SUTURE PASSER - 16 CM

## (undated) DEVICE — APPL CHLORAPREP HI/LITE 26ML ORNG

## (undated) DEVICE — SYR LUERLOK 30CC